# Patient Record
Sex: FEMALE | Race: ASIAN | NOT HISPANIC OR LATINO | Employment: UNEMPLOYED
[De-identification: names, ages, dates, MRNs, and addresses within clinical notes are randomized per-mention and may not be internally consistent; named-entity substitution may affect disease eponyms.]

---

## 2018-01-09 ENCOUNTER — RECORDS - HEALTHEAST (OUTPATIENT)
Dept: ADMINISTRATIVE | Facility: OTHER | Age: 53
End: 2018-01-09

## 2021-05-31 VITALS — BODY MASS INDEX: 31.35 KG/M2 | WEIGHT: 150 LBS | HEIGHT: 58 IN

## 2024-01-11 ENCOUNTER — APPOINTMENT (OUTPATIENT)
Dept: RADIOLOGY | Facility: HOSPITAL | Age: 59
End: 2024-01-11
Attending: EMERGENCY MEDICINE
Payer: COMMERCIAL

## 2024-01-11 ENCOUNTER — HOSPITAL ENCOUNTER (EMERGENCY)
Facility: HOSPITAL | Age: 59
Discharge: HOME | End: 2024-01-11
Attending: EMERGENCY MEDICINE | Admitting: EMERGENCY MEDICINE
Payer: COMMERCIAL

## 2024-01-11 VITALS
DIASTOLIC BLOOD PRESSURE: 76 MMHG | WEIGHT: 165 LBS | BODY MASS INDEX: 34.63 KG/M2 | HEIGHT: 58 IN | HEART RATE: 91 BPM | OXYGEN SATURATION: 95 % | SYSTOLIC BLOOD PRESSURE: 128 MMHG | RESPIRATION RATE: 18 BRPM | TEMPERATURE: 98.1 F

## 2024-01-11 DIAGNOSIS — R05.3 CHRONIC COUGH: ICD-10-CM

## 2024-01-11 DIAGNOSIS — R06.2 WHEEZING: ICD-10-CM

## 2024-01-11 LAB
ANION GAP SERPL CALCULATED.3IONS-SCNC: 10 MMOL/L (ref 7–15)
BASOPHILS # BLD AUTO: 0 10E3/UL (ref 0–0.2)
BASOPHILS NFR BLD AUTO: 1 %
BUN SERPL-MCNC: 11.3 MG/DL (ref 6–20)
CALCIUM SERPL-MCNC: 9.3 MG/DL (ref 8.6–10)
CHLORIDE SERPL-SCNC: 102 MMOL/L (ref 98–107)
CREAT SERPL-MCNC: 0.73 MG/DL (ref 0.51–0.95)
DEPRECATED HCO3 PLAS-SCNC: 26 MMOL/L (ref 22–29)
EGFRCR SERPLBLD CKD-EPI 2021: >90 ML/MIN/1.73M2
EOSINOPHIL # BLD AUTO: 0.1 10E3/UL (ref 0–0.7)
EOSINOPHIL NFR BLD AUTO: 2 %
ERYTHROCYTE [DISTWIDTH] IN BLOOD BY AUTOMATED COUNT: 13.4 % (ref 10–15)
FLUAV RNA SPEC QL NAA+PROBE: NEGATIVE
FLUBV RNA RESP QL NAA+PROBE: NEGATIVE
GLUCOSE SERPL-MCNC: 120 MG/DL (ref 70–99)
HCT VFR BLD AUTO: 43.5 % (ref 35–47)
HGB BLD-MCNC: 14.2 G/DL (ref 11.7–15.7)
IMM GRANULOCYTES # BLD: 0 10E3/UL
IMM GRANULOCYTES NFR BLD: 0 %
LYMPHOCYTES # BLD AUTO: 1.2 10E3/UL (ref 0.8–5.3)
LYMPHOCYTES NFR BLD AUTO: 29 %
MCH RBC QN AUTO: 29.6 PG (ref 26.5–33)
MCHC RBC AUTO-ENTMCNC: 32.6 G/DL (ref 31.5–36.5)
MCV RBC AUTO: 91 FL (ref 78–100)
MONOCYTES # BLD AUTO: 0.5 10E3/UL (ref 0–1.3)
MONOCYTES NFR BLD AUTO: 12 %
NEUTROPHILS # BLD AUTO: 2.2 10E3/UL (ref 1.6–8.3)
NEUTROPHILS NFR BLD AUTO: 56 %
NRBC # BLD AUTO: 0 10E3/UL
NRBC BLD AUTO-RTO: 0 /100
PLATELET # BLD AUTO: 187 10E3/UL (ref 150–450)
POTASSIUM SERPL-SCNC: 3.7 MMOL/L (ref 3.4–5.3)
RBC # BLD AUTO: 4.8 10E6/UL (ref 3.8–5.2)
RSV RNA SPEC NAA+PROBE: NEGATIVE
SARS-COV-2 RNA RESP QL NAA+PROBE: NEGATIVE
SODIUM SERPL-SCNC: 138 MMOL/L (ref 135–145)
TROPONIN T SERPL HS-MCNC: <6 NG/L
WBC # BLD AUTO: 4 10E3/UL (ref 4–11)

## 2024-01-11 PROCEDURE — 84484 ASSAY OF TROPONIN QUANT: CPT | Performed by: EMERGENCY MEDICINE

## 2024-01-11 PROCEDURE — 93005 ELECTROCARDIOGRAM TRACING: CPT | Performed by: STUDENT IN AN ORGANIZED HEALTH CARE EDUCATION/TRAINING PROGRAM

## 2024-01-11 PROCEDURE — 250N000009 HC RX 250: Performed by: EMERGENCY MEDICINE

## 2024-01-11 PROCEDURE — 80048 BASIC METABOLIC PNL TOTAL CA: CPT | Performed by: EMERGENCY MEDICINE

## 2024-01-11 PROCEDURE — 36415 COLL VENOUS BLD VENIPUNCTURE: CPT | Performed by: STUDENT IN AN ORGANIZED HEALTH CARE EDUCATION/TRAINING PROGRAM

## 2024-01-11 PROCEDURE — 250N000012 HC RX MED GY IP 250 OP 636 PS 637: Performed by: EMERGENCY MEDICINE

## 2024-01-11 PROCEDURE — 71046 X-RAY EXAM CHEST 2 VIEWS: CPT

## 2024-01-11 PROCEDURE — 999N000157 HC STATISTIC RCP TIME EA 10 MIN

## 2024-01-11 PROCEDURE — 94640 AIRWAY INHALATION TREATMENT: CPT

## 2024-01-11 PROCEDURE — 85025 COMPLETE CBC W/AUTO DIFF WBC: CPT | Performed by: EMERGENCY MEDICINE

## 2024-01-11 PROCEDURE — 93005 ELECTROCARDIOGRAM TRACING: CPT | Performed by: EMERGENCY MEDICINE

## 2024-01-11 PROCEDURE — 99285 EMERGENCY DEPT VISIT HI MDM: CPT | Mod: 25

## 2024-01-11 PROCEDURE — 87637 SARSCOV2&INF A&B&RSV AMP PRB: CPT | Performed by: EMERGENCY MEDICINE

## 2024-01-11 RX ORDER — BENZONATATE 100 MG/1
100 CAPSULE ORAL 3 TIMES DAILY PRN
Status: DISCONTINUED | OUTPATIENT
Start: 2024-01-11 | End: 2024-01-11 | Stop reason: HOSPADM

## 2024-01-11 RX ORDER — IPRATROPIUM BROMIDE AND ALBUTEROL SULFATE 2.5; .5 MG/3ML; MG/3ML
3 SOLUTION RESPIRATORY (INHALATION) ONCE
Status: COMPLETED | OUTPATIENT
Start: 2024-01-11 | End: 2024-01-11

## 2024-01-11 RX ORDER — BENZONATATE 100 MG/1
100 CAPSULE ORAL 3 TIMES DAILY PRN
Qty: 20 CAPSULE | Refills: 0 | Status: SHIPPED | OUTPATIENT
Start: 2024-01-11

## 2024-01-11 RX ORDER — PREDNISONE 20 MG/1
40 TABLET ORAL DAILY
Qty: 10 TABLET | Refills: 0 | Status: SHIPPED | OUTPATIENT
Start: 2024-01-11 | End: 2024-01-16

## 2024-01-11 RX ORDER — PREDNISONE 20 MG/1
60 TABLET ORAL ONCE
Status: COMPLETED | OUTPATIENT
Start: 2024-01-11 | End: 2024-01-11

## 2024-01-11 RX ORDER — ALBUTEROL SULFATE 90 UG/1
2 AEROSOL, METERED RESPIRATORY (INHALATION) EVERY 4 HOURS PRN
Qty: 18 G | Refills: 0 | Status: SHIPPED | OUTPATIENT
Start: 2024-01-11

## 2024-01-11 RX ADMIN — IPRATROPIUM BROMIDE AND ALBUTEROL SULFATE 3 ML: .5; 3 SOLUTION RESPIRATORY (INHALATION) at 18:44

## 2024-01-11 RX ADMIN — PREDNISONE 60 MG: 20 TABLET ORAL at 19:03

## 2024-01-11 ASSESSMENT — ENCOUNTER SYMPTOMS
WHEEZING: 1
SHORTNESS OF BREATH: 1
COUGH: 1

## 2024-01-11 ASSESSMENT — ACTIVITIES OF DAILY LIVING (ADL): ADLS_ACUITY_SCORE: 35

## 2024-01-11 NOTE — ED TRIAGE NOTES
Patient arrives with complaint of 1 month of cough and feeling short of breath. States that over the past 2 days she feels like her cough has worsening significantly with her having an increase in sputum production and chest pain. Also reports headache that worsens with coughing at well.      Triage Assessment (Adult)       Row Name 01/11/24 1610          Triage Assessment    Airway WDL WDL        Respiratory WDL    Respiratory WDL X;cough     Cough Frequency frequent     Cough Type productive        Skin Circulation/Temperature WDL    Skin Circulation/Temperature WDL WDL        Cardiac WDL    Cardiac WDL X;chest pain        Peripheral/Neurovascular WDL    Peripheral Neurovascular WDL WDL        Cognitive/Neuro/Behavioral WDL    Cognitive/Neuro/Behavioral WDL WDL

## 2024-01-11 NOTE — ED PROVIDER NOTES
EMERGENCY DEPARTMENT ENCOUNTER      NAME: Carmencita Avila  AGE: 58 year old female  YOB: 1965  MRN: [id number]  EVALUATION DATE & TIME: No admission date for patient encounter.    PCP: Nancy Agee    ED PROVIDER: Chuck Velasco DO      Chief Complaint   Patient presents with    Chest Pain    Cough    Shortness of Breath    Headache         FINAL IMPRESSION:  1. Chronic cough    2. Wheezing          ED COURSE & MEDICAL DECISION MAKIN-year-old female presented to the ED for evaluation of cough and wheezing that been ongoing for over 1 month.  Here in the ED the patient was hypertensive upon arrival.  She was otherwise hemodynamic stable with O2 sats in the upper 90s on room air.  The patient did not appear to have any respiratory difficulty and she was not ill-appearing at the time of her initial evaluation.  On exam the patient was noted to have mild bilateral expiratory wheezing.  The remainder physical exam was unremarkable.    An EKG was obtained which revealed normal sinus rhythm without any concerning ST or T wave changes.    Following her initial evaluation the patient was given a DuoNeb breathing treatment, prednisone, and Tessalon Perles.     CBC, BMP, and troponin were all reassuring.  Testing for COVID, influenza, and RSV were negative.  The chest x-ray was nondiagnostic.    The patient was reevaluated and informed of the reassuring lab, EKG, and chest x-ray results.  The patient stated that the breathing treatments seem to help relieve her cough and wheezing.  Following this the patient felt comfortable returning home.  She was sent home with a 5-day course of prednisone.  She was also sent home with an albuterol inhaler and Tessalon to use as needed for wheezing and coughing.  The patient was instructed to follow-up with her primary care provider for reevaluation or to return back to ED sooner for any worsening respiratory difficulty or any other new or concerning symptoms.    Pertinent Labs &  Imaging studies reviewed. (See chart for details)  17:20 I met with the patient to gather history and to perform my initial exam. We discussed plans for the ED course, including diagnostic testing and treatment.       At the conclusion of the encounter I discussed the results of all of the tests and the disposition. The questions were answered. The patient or family acknowledged understanding and was agreeable with the care plan.     Medical Decision Making    History:  Supplemental history from: Documented in chart  External Record(s) reviewed: Documented in chart    Work Up:  Chart documentation includes differential considered and any EKGs or imaging independently interpreted by provider, where specified.  In additional to work up documented, I considered the following work up: Documented in chart, if applicable.    External consultation:  Discussion of management with another provider: Documented in chart, if applicable    Complicating factors:  Care impacted by chronic illness: N/A  Care affected by social determinants of health: N/A    Disposition considerations: Discharge. I prescribed additional prescription strength medication(s) as charted. See documentation for any additional details.      PPE worn: n95 mask, goggles    MEDICATIONS GIVEN IN THE EMERGENCY:  Medications   ipratropium - albuterol 0.5 mg/2.5 mg/3 mL (DUONEB) neb solution 3 mL (3 mLs Nebulization $Given 1/11/24 1844)   predniSONE (DELTASONE) tablet 60 mg (60 mg Oral $Given 1/11/24 1903)       NEW PRESCRIPTIONS STARTED AT TODAY'S ER VISIT  Discharge Medication List as of 1/11/2024  7:54 PM        START taking these medications    Details   albuterol (PROAIR HFA/PROVENTIL HFA/VENTOLIN HFA) 108 (90 Base) MCG/ACT inhaler Inhale 2 puffs into the lungs every 4 hours as needed for shortness of breath, wheezing or cough, Disp-18 g, R-0, E-PrescribePharmacy may dispense brand covered by insurance (Proair, or proventil or ventolin or generic albuterol  inhaler)      benzonatate (TESSALON) 100 MG capsule Take 1 capsule (100 mg) by mouth 3 times daily as needed for cough, Disp-20 capsule, R-0, E-Prescribe      predniSONE (DELTASONE) 20 MG tablet Take 2 tablets (40 mg) by mouth daily for 5 days Take two tablets (= 40mg) each day for 5 (five) days, Disp-10 tablet, R-0, E-Prescribe                =================================================================    HPI    Patient information was obtained from: Patient     Use of : N/A. Patient and  declined interpretor        Carmencita Avila is a 58 year old female who presents to this ED for evaluation of cough.  Patient states that has been experiencing cough for over 1 month.  She reports associated chest pain and shortness of breath that occurs only with the coughing episodes.  The patient also reports that she has been wheezing intermittently for the last month as well.  She denies any fevers or chills, body aches, or any ear pain or swelling in her lower extremities.  Patient states that she was given a 5-day course of an antibiotic, likely azithromycin, approximately 1 month ago without any improvement.  She also also been using over-the-counter NyQuil and Benadryl to help her sleep at night.  The patient had no other complaints or symptoms.      REVIEW OF SYSTEMS   Review of Systems   Respiratory:  Positive for cough, shortness of breath and wheezing.    Cardiovascular:  Positive for chest pain.        PAST MEDICAL HISTORY:  No past medical history on file.    PAST SURGICAL HISTORY:  No past surgical history on file.        CURRENT MEDICATIONS:    albuterol (PROAIR HFA/PROVENTIL HFA/VENTOLIN HFA) 108 (90 Base) MCG/ACT inhaler  benzonatate (TESSALON) 100 MG capsule  predniSONE (DELTASONE) 20 MG tablet        ALLERGIES:  No Known Allergies    FAMILY HISTORY:  No family history on file.    SOCIAL HISTORY:   Social History     Socioeconomic History    Marital status: Single       VITALS:  /76    "Pulse 91   Temp 98.1  F (36.7  C) (Temporal)   Resp 18   Ht 1.473 m (4' 10\")   Wt 74.8 kg (165 lb)   SpO2 95%   BMI 34.49 kg/m      PHYSICAL EXAM    General presentation: Alert, Vital signs reviewed. NAD  HENT: ENT inspection is normal. Oropharynx is moist and clear.   Eye: Pupils are equal and reactive to light. EOMI  Neck: The neck is supple, with full ROM, with no evidence of meningismus.  Pulmonary: Currently in no acute respiratory distress. Normal, non labored respirations, the lung sounds are normal with good equal air movement.  Mild expiratory wheezing noted bilaterally.   Circulatory: Regular rate and rhythm. Peripheral pulses are strong and equal. No murmurs, rubs, or gallops.   Abdominal: The abdomen is soft. Nontender. No rigidity, guarding, or rebound. Bowel sounds normal.   Neurologic: Alert, oriented to person, place, and time. No motor deficit. No sensory deficit. Cranial nerves II through XII are intact.  Musculoskeletal: No extremity tenderness. Full range of motion in all extremities. No extremity edema.   Skin: Skin color is normal. No rash. Warm. Dry to touch.      LAB:  All pertinent labs reviewed and interpreted.  Results for orders placed or performed during the hospital encounter of 01/11/24   XR Chest 2 Views    Impression    IMPRESSION: Negative chest.   Result Value Ref Range    Troponin T, High Sensitivity <6 <=14 ng/L   Basic metabolic panel   Result Value Ref Range    Sodium 138 135 - 145 mmol/L    Potassium 3.7 3.4 - 5.3 mmol/L    Chloride 102 98 - 107 mmol/L    Carbon Dioxide (CO2) 26 22 - 29 mmol/L    Anion Gap 10 7 - 15 mmol/L    Urea Nitrogen 11.3 6.0 - 20.0 mg/dL    Creatinine 0.73 0.51 - 0.95 mg/dL    GFR Estimate >90 >60 mL/min/1.73m2    Calcium 9.3 8.6 - 10.0 mg/dL    Glucose 120 (H) 70 - 99 mg/dL   Symptomatic Influenza A/B, RSV, & SARS-CoV2 PCR (COVID-19) Nasopharyngeal    Specimen: Nasopharyngeal; Swab   Result Value Ref Range    Influenza A PCR Negative Negative    " Influenza B PCR Negative Negative    RSV PCR Negative Negative    SARS CoV2 PCR Negative Negative   CBC with platelets and differential   Result Value Ref Range    WBC Count 4.0 4.0 - 11.0 10e3/uL    RBC Count 4.80 3.80 - 5.20 10e6/uL    Hemoglobin 14.2 11.7 - 15.7 g/dL    Hematocrit 43.5 35.0 - 47.0 %    MCV 91 78 - 100 fL    MCH 29.6 26.5 - 33.0 pg    MCHC 32.6 31.5 - 36.5 g/dL    RDW 13.4 10.0 - 15.0 %    Platelet Count 187 150 - 450 10e3/uL    % Neutrophils 56 %    % Lymphocytes 29 %    % Monocytes 12 %    % Eosinophils 2 %    % Basophils 1 %    % Immature Granulocytes 0 %    NRBCs per 100 WBC 0 <1 /100    Absolute Neutrophils 2.2 1.6 - 8.3 10e3/uL    Absolute Lymphocytes 1.2 0.8 - 5.3 10e3/uL    Absolute Monocytes 0.5 0.0 - 1.3 10e3/uL    Absolute Eosinophils 0.1 0.0 - 0.7 10e3/uL    Absolute Basophils 0.0 0.0 - 0.2 10e3/uL    Absolute Immature Granulocytes 0.0 <=0.4 10e3/uL    Absolute NRBCs 0.0 10e3/uL       RADIOLOGY:  Reviewed all pertinent imaging. Please see official radiology report.  XR Chest 2 Views   Final Result   IMPRESSION: Negative chest.          EKG:    Normal sinus rhythm.  Rate of 89.  Normal QRS.  Normal QT.  No ST or T wave changes.  No significant change when compared to EKG on 10/25/2020.    I have independently reviewed and interpreted the EKG(s) documented above.        Chuck Velasco DO  Emergency Medicine  [facility] EMERGENCY DEPARTMENT  [facility] [facility] [id number] [phone]       Chuck Velasco DO  01/12/24 0013

## 2024-01-12 LAB
ATRIAL RATE - MUSE: 89 BPM
DIASTOLIC BLOOD PRESSURE - MUSE: NORMAL MMHG
INTERPRETATION ECG - MUSE: NORMAL
P AXIS - MUSE: 52 DEGREES
PR INTERVAL - MUSE: 152 MS
QRS DURATION - MUSE: 82 MS
QT - MUSE: 378 MS
QTC - MUSE: 459 MS
R AXIS - MUSE: 90 DEGREES
SYSTOLIC BLOOD PRESSURE - MUSE: NORMAL MMHG
T AXIS - MUSE: 62 DEGREES
VENTRICULAR RATE- MUSE: 89 BPM

## 2024-01-12 NOTE — DISCHARGE INSTRUCTIONS
EKG, chest x-ray, and all laboratory test were reassuring here tonight in the emergency department.    Take the prednisone daily as directed to treat your chronic cough.  Use the albuterol inhaler and the Tessalon as needed for any further coughing/wheezing.      Follow-up with your primary care provider for reevaluation or return back to ED sooner for any worsening respiratory difficulty or any other new or concerning symptoms.

## 2025-08-14 ENCOUNTER — TRANSFERRED RECORDS (OUTPATIENT)
Dept: HEALTH INFORMATION MANAGEMENT | Facility: CLINIC | Age: 60
End: 2025-08-14
Payer: COMMERCIAL